# Patient Record
Sex: FEMALE | Employment: FULL TIME | RURAL
[De-identification: names, ages, dates, MRNs, and addresses within clinical notes are randomized per-mention and may not be internally consistent; named-entity substitution may affect disease eponyms.]

---

## 2017-01-20 ENCOUNTER — OFFICE VISIT (OUTPATIENT)
Dept: SURGERY | Age: 62
End: 2017-01-20

## 2017-01-20 VITALS
HEART RATE: 78 BPM | HEIGHT: 64 IN | SYSTOLIC BLOOD PRESSURE: 137 MMHG | BODY MASS INDEX: 24.75 KG/M2 | DIASTOLIC BLOOD PRESSURE: 72 MMHG | WEIGHT: 145 LBS

## 2017-01-20 DIAGNOSIS — K80.10 CALCULUS OF GALLBLADDER WITH CHOLECYSTITIS WITHOUT BILIARY OBSTRUCTION, UNSPECIFIED CHOLECYSTITIS ACUITY: ICD-10-CM

## 2017-01-20 DIAGNOSIS — R10.11 ABDOMINAL PAIN, RIGHT UPPER QUADRANT: Primary | ICD-10-CM

## 2017-01-20 RX ORDER — MECLIZINE HYDROCHLORIDE 25 MG/1
TABLET ORAL
COMMUNITY

## 2017-01-20 NOTE — PROGRESS NOTES
HISTORY OF PRESENT ILLNESS  Teresa Menchaca is a 64 y.o. female. Patient referred by Ken Hughes for evaluation of right upper quadrant pain. HPI patient has been experiencing episodes of right upper quadrant abdominal pain. Usually after eating only foods. Recently her primary care physician ordered an ultrasound that showed her to have gallstones. She is interested in talking about gallbladder surgery. She has had some issues with trouble awakening after anesthesia from prior dental surgery and after a prior gynecological procedure. Past Medical History   Diagnosis Date    Anxiety attack     Atrophic vaginitis 12/10/2013     Treat w/ Vagifem    Hematuria      Chronic    Lichen planus 1/8740     by biopsy, though not definitive    Menarche      onset at age [age]    Menopause      onset at age 46    Rosacea     Ruptured disk 1/2005     L5    Vertigo 4/2004, 2013       Past Surgical History   Procedure Laterality Date    Hx tonsillectomy      Hx dilation and curettage  10/1/2004     (MV)    Hx other surgical  2005     Epidural x 3    Hx urological  11/1997     Cystoscopy    Hx urological  1997     IVP    Hx gyn  3/2006     Vulvar Biopsy         Current Outpatient Prescriptions:     meclizine (ANTIVERT) 25 mg tablet, Take  by mouth three (3) times daily as needed. , Disp: , Rfl:     metroNIDAZOLE (METROCREAM) 0.75 % topical cream, Apply  to affected area two (2) times a day. Use a thin layer to affected areas after washing, Disp: , Rfl:     MULTIVITAMIN PO, Take  by mouth., Disp: , Rfl:     omega-3 fatty acids-vitamin e (FISH OIL) 1,000 mg cap, Take 1 Cap by mouth., Disp: , Rfl:     IBUPROFEN PO, Take  by mouth as needed. , Disp: , Rfl:     estradiol (VAGIFEM) 10 mcg tab vaginal tablet, Insert 1 Tab into vagina two (2) times a week on Wednesday and Saturday. , Disp: 9 Tab, Rfl: prn    Macrodantin [nitrofurantoin macrocrystalline];  Neosporin [neomycin-bacitracin-polymyxin]; and Pcn [penicillins]    family history includes Cancer in her sister; Colon Cancer in her mother; Diabetes in an other family member; Heart Attack (age of onset: 58) in her father; Other in an other family member; Ovarian Cancer in an other family member. Social History     Social History    Marital status:      Spouse name: N/A    Number of children: N/A    Years of education: N/A     Occupational History    Not on file. Social History Main Topics    Smoking status: Never Smoker    Smokeless tobacco: Not on file    Alcohol use Yes    Drug use: Not on file    Sexual activity: No     Other Topics Concern    Not on file     Social History Narrative         Review of Systems   Constitutional: Negative. HENT: Negative. Eyes: Negative. Respiratory: Negative. Cardiovascular: Negative. Gastrointestinal: Positive for abdominal pain and nausea. Negative for blood in stool, constipation, diarrhea, heartburn, melena and vomiting. Genitourinary: Negative. Musculoskeletal: Negative. Skin: Positive for rash. Rosacea. Also has reaction to adhesive tape. Neurological: Negative. Endo/Heme/Allergies: Negative. Psychiatric/Behavioral: Negative. Physical Exam   Constitutional: She is oriented to person, place, and time. She appears well-developed and well-nourished. No distress. HENT:   Head: Normocephalic and atraumatic. Right Ear: External ear normal.   Left Ear: External ear normal.   Nose: Nose normal.   Mouth/Throat: Oropharynx is clear and moist. No oropharyngeal exudate. Eyes: Conjunctivae and EOM are normal. Pupils are equal, round, and reactive to light. Right eye exhibits no discharge. Left eye exhibits no discharge. No scleral icterus. Neck: Normal range of motion. Neck supple. No JVD present. No tracheal deviation present. No thyromegaly present. Cardiovascular: Normal rate, regular rhythm, normal heart sounds and intact distal pulses.   Exam reveals no gallop and no friction rub. No murmur heard. Pulmonary/Chest: Effort normal and breath sounds normal. No stridor. No respiratory distress. She has no wheezes. She has no rales. She exhibits no tenderness. Abdominal: Soft. Bowel sounds are normal. She exhibits no distension and no mass. There is no tenderness. There is no rebound and no guarding. Musculoskeletal: Normal range of motion. She exhibits no edema, tenderness or deformity. Lymphadenopathy:     She has no cervical adenopathy. Neurological: She is alert and oriented to person, place, and time. She has normal reflexes. She displays normal reflexes. No cranial nerve deficit. She exhibits normal muscle tone. Coordination normal.   Skin: Skin is warm and dry. No rash noted. She is not diaphoretic. No erythema. No pallor. Psychiatric: She has a normal mood and affect. Her behavior is normal. Judgment and thought content normal.       ASSESSMENT and PLAN    Biliary colic and gallstones. Identified by ultrasound. Patient describes some trouble awakening after anesthesia for dental surgery will request anesthesia preop consult. Schedule the patient for laparoscopic vs. open cholecystectomy and indicated procedures. The expected benefits and potential risks and alternatives are discussed with the patient who demonstrates understanding and expresses her wish to have the procedure. More than half of the time spent with the patient was in face to face counseling. I spent 45 minutes in this encounter with the patient.

## 2017-01-20 NOTE — PATIENT INSTRUCTIONS
Cholecystectomy: Before Your Surgery  What is cholecystectomy? Cholecystectomy (uc-ylp-gnh-ZARIA-tuh-sonam) is a type of surgery. It removes a diseased gallbladder. This surgery is usually done as a laparoscopic surgery. The doctor puts a lighted tube and other surgical tools through small cuts (incisions) in your belly. The tube is called a scope. It lets your doctor see your organs so he or she can do the surgery. The incisions leave scars that fade with time. Most people go home the same day. You probably will feel better each day. Most people have only a small amount of pain after 1 week. If you have a desk job, you can probably go back to work in 1 to 2 weeks. If you lift heavy objects or have a very active job, it may take up to 4 weeks. In some cases, open surgery is the best choice. Your doctor may choose open surgery in advance. Or he or she may choose it in the middle of laparoscopic surgery. In open surgery, the doctor makes a larger incision in your upper belly. If you have open surgery, you will probably stay in the hospital for 2 to 4 days. And it may take 4 to 6 weeks to get back to your normal routine. Follow-up care is a key part of your treatment and safety. Be sure to make and go to all appointments, and call your doctor if you are having problems. It's also a good idea to know your test results and keep a list of the medicines you take. What happens before surgery? Surgery can be stressful. This information will help you understand what you can expect. And it will help you safely prepare for surgery. Preparing for surgery  · Understand exactly what surgery is planned, along with the risks, benefits, and other options. · Tell your doctors ALL the medicines, vitamins, supplements, and herbal remedies you take. Some of these can increase the risk of bleeding or interact with anesthesia.   · If you take blood thinners, such as warfarin (Coumadin), clopidogrel (Plavix), or aspirin, be sure to talk to your doctor. He or she will tell you if you should stop taking these medicines before your surgery. Make sure that you understand exactly what your doctor wants you to do. · Your doctor will tell you which medicines to take or stop before your surgery. You may need to stop taking certain medicines a week or more before surgery. So talk to your doctor as soon as you can. · If you have an advance directive, let your doctor know. It may include a living will and a durable power of  for health care. Bring a copy to the hospital. If you don't have one, you may want to prepare one. It lets your doctor and loved ones know your health care wishes. Doctors advise that everyone prepare these papers before any type of surgery or procedure. · You may need to empty your colon with an enema or laxative. Your doctor will tell you how to do this. What happens on the day of surgery? · Follow the instructions exactly about when to stop eating and drinking. If you don't, your surgery may be canceled. If your doctor told you to take your medicines on the day of surgery, take them with only a sip of water. · Take a bath or shower before you come in for your surgery. Do not apply lotions, perfumes, deodorants, or nail polish. · Do not shave the surgical site yourself. · Take off all jewelry and piercings. And take out contact lenses, if you wear them. At the hospital or surgery center  · Bring a picture ID. · The area for surgery is often marked to make sure there are no errors. · You will be kept comfortable and safe by your anesthesia provider. You will be asleep during the surgery. · The surgery usually takes 1 to 2 hours. Going home  · Be sure you have someone to drive you home. Anesthesia and pain medicine make it unsafe for you to drive. · You will be given more specific instructions about recovering from your surgery.  They will cover things like diet, wound care, follow-up care, driving, and getting back to your normal routine. When should you call your doctor? · You have questions or concerns. · You don't understand how to prepare for your surgery. · You become ill before the surgery (such as fever, flu, or a cold). · You need to reschedule or have changed your mind about having the surgery. Where can you learn more? Go to http://ezekiel-angela.info/. Enter K270 in the search box to learn more about \"Cholecystectomy: Before Your Surgery. \"  Current as of: August 9, 2016  Content Version: 11.1  © 0983-6833 [employer]. Care instructions adapted under license by [employer] (which disclaims liability or warranty for this information). If you have questions about a medical condition or this instruction, always ask your healthcare professional. [address] any warranty or liability for your use of this information.

## 2017-02-20 ENCOUNTER — OFFICE VISIT (OUTPATIENT)
Dept: SURGERY | Age: 62
End: 2017-02-20

## 2017-02-20 VITALS
WEIGHT: 146.6 LBS | HEIGHT: 64 IN | OXYGEN SATURATION: 97 % | RESPIRATION RATE: 18 BRPM | TEMPERATURE: 98.7 F | BODY MASS INDEX: 25.03 KG/M2 | DIASTOLIC BLOOD PRESSURE: 82 MMHG | SYSTOLIC BLOOD PRESSURE: 137 MMHG | HEART RATE: 78 BPM

## 2017-02-20 DIAGNOSIS — K80.10 CALCULUS OF GALLBLADDER WITH CHRONIC CHOLECYSTITIS WITHOUT OBSTRUCTION: Primary | ICD-10-CM

## 2017-02-20 DIAGNOSIS — Z98.890 POSTOPERATIVE STATE: ICD-10-CM

## 2017-02-20 NOTE — LETTER
NOTIFICATION OF RETURN TO WORK / SCHOOL 
 
2/20/2017 9:37 AM 
 
Ms. Sujey Menendez [id number] [facility] To Whom It May Concern: Sujey Menendez was under the care of [address] 2/9/17. She will be able to return to work/school on 2/13/17 with a 35 pound lifting restriction. She may increase this by 5 pounds per week until back to normal capacity. If there are questions or concerns please have the patient contact our office. Sincerely, Yulissa Urbano MD

## 2019-05-21 PROBLEM — H25.10 AGE-RELATED NUCLEAR CATARACT: Chronic | Status: ACTIVE | Noted: 2019-05-21

## 2019-05-22 PROBLEM — H25.10 AGE-RELATED NUCLEAR CATARACT: Chronic | Status: RESOLVED | Noted: 2019-05-21 | Resolved: 2019-05-22

## 2019-05-24 PROBLEM — H25.11 AGE-RELATED NUCLEAR CATARACT, RIGHT EYE: Chronic | Status: ACTIVE | Noted: 2019-05-24

## 2019-05-29 PROBLEM — H25.11 AGE-RELATED NUCLEAR CATARACT, RIGHT EYE: Chronic | Status: RESOLVED | Noted: 2019-05-24 | Resolved: 2019-05-29

## 2021-10-21 ENCOUNTER — TRANSCRIBE ORDER (OUTPATIENT)
Dept: SCHEDULING | Age: 66
End: 2021-10-21

## 2021-10-21 DIAGNOSIS — Z12.31 VISIT FOR SCREENING MAMMOGRAM: Primary | ICD-10-CM

## 2021-12-16 ENCOUNTER — HOSPITAL ENCOUNTER (OUTPATIENT)
Dept: MAMMOGRAPHY | Age: 66
Discharge: HOME | End: 2021-12-16
Attending: FAMILY MEDICINE
Payer: MEDICARE

## 2021-12-16 VITALS — BODY MASS INDEX: 26.29 KG/M2 | WEIGHT: 158 LBS

## 2021-12-16 DIAGNOSIS — Z12.31 VISIT FOR SCREENING MAMMOGRAM: ICD-10-CM

## 2021-12-16 PROCEDURE — 77063 BREAST TOMOSYNTHESIS BI: CPT

## 2023-05-20 RX ORDER — ESTRADIOL 10 UG/1
10 INSERT VAGINAL
COMMUNITY
Start: 2013-12-11

## 2023-05-20 RX ORDER — MECLIZINE HYDROCHLORIDE 25 MG/1
TABLET ORAL 3 TIMES DAILY PRN
COMMUNITY